# Patient Record
Sex: MALE | Race: OTHER | HISPANIC OR LATINO | Employment: UNEMPLOYED | URBAN - METROPOLITAN AREA
[De-identification: names, ages, dates, MRNs, and addresses within clinical notes are randomized per-mention and may not be internally consistent; named-entity substitution may affect disease eponyms.]

---

## 2023-01-26 ENCOUNTER — HOSPITAL ENCOUNTER (EMERGENCY)
Facility: HOSPITAL | Age: 33
Discharge: HOME | End: 2023-01-26
Attending: EMERGENCY MEDICINE

## 2023-01-26 VITALS
RESPIRATION RATE: 20 BRPM | TEMPERATURE: 97.7 F | HEART RATE: 101 BPM | WEIGHT: 257.28 LBS | DIASTOLIC BLOOD PRESSURE: 97 MMHG | OXYGEN SATURATION: 98 % | SYSTOLIC BLOOD PRESSURE: 180 MMHG

## 2023-01-26 DIAGNOSIS — G89.29 CHRONIC BACK PAIN: Primary | ICD-10-CM

## 2023-01-26 DIAGNOSIS — M54.9 CHRONIC BACK PAIN: Primary | ICD-10-CM

## 2023-01-26 RX ORDER — NAPROXEN 500 MG/1
500 TABLET ORAL 2 TIMES DAILY WITH MEALS
Qty: 30 TABLET | Refills: 0 | Status: SHIPPED | OUTPATIENT
Start: 2023-01-26

## 2023-01-26 NOTE — Clinical Note
Belem La was seen and treated in our emergency department on 1/26/2023  Diagnosis:     Chester  may return to work on return date  He may return on this date: 01/27/2023         If you have any questions or concerns, please don't hesitate to call        Zoltan Wilson MD    ______________________________           _______________          _______________  Hospital Representative                              Date                                Time

## 2023-01-27 NOTE — ED PROVIDER NOTES
History  Chief Complaint   Patient presents with   • Back Pain     Patient is a 33-year-old male who presents with a 6+ year h/o back pain  Ongoing issue  Was seen by doctors in [place]  But now here  Had to leave work early due to pain  No direct trauma  No numbness/weakness  Taking OTC motrin with some relief  Has not seen a doctor in [place] yet  No radiation  Right lower back  Worse with movement  Patient is also asking for info for weight loss  Believes all started when he gained weight  None       History reviewed  No pertinent past medical history  History reviewed  No pertinent surgical history  History reviewed  No pertinent family history  I have reviewed and agree with the history as documented  E-Cigarette/Vaping     E-Cigarette/Vaping Substances     Social History     Tobacco Use   • Smoking status: Every Day     Types: Cigarettes     Passive exposure: Never   • Smokeless tobacco: Never   Substance Use Topics   • Alcohol use: Not Currently   • Drug use: Never       Review of Systems   Constitutional: Negative  HENT: Negative  Eyes: Negative  Respiratory: Negative  Cardiovascular: Negative  Gastrointestinal: Negative  Endocrine: Negative  Genitourinary: Negative  Musculoskeletal: Positive for back pain  Skin: Negative  Allergic/Immunologic: Negative  Neurological: Negative  Hematological: Negative  Psychiatric/Behavioral: Negative  All other systems reviewed and are negative  Physical Exam  Physical Exam  Vitals and nursing note reviewed  Constitutional:       Appearance: Normal appearance  He is obese  HENT:      Head: Normocephalic and atraumatic  Cardiovascular:      Rate and Rhythm: Normal rate and regular rhythm  Pulses: Normal pulses  Heart sounds: Normal heart sounds  Pulmonary:      Effort: Pulmonary effort is normal       Breath sounds: Normal breath sounds     Abdominal:      General: Bowel sounds are normal       Palpations: Abdomen is soft  Musculoskeletal:         General: No swelling, tenderness, deformity or signs of injury  Cervical back: Normal range of motion and neck supple  Right lower leg: No edema  Left lower leg: No edema  Skin:     General: Skin is warm and dry  Capillary Refill: Capillary refill takes less than 2 seconds  Neurological:      General: No focal deficit present  Mental Status: He is alert and oriented to person, place, and time  Sensory: No sensory deficit  Deep Tendon Reflexes: Reflexes normal    Psychiatric:         Mood and Affect: Mood normal          Behavior: Behavior normal          Vital Signs  ED Triage Vitals [01/26/23 2112]   Temperature Pulse Respirations Blood Pressure SpO2   97 7 °F (36 5 °C) 101 20 (!) 180/97 98 %      Temp Source Heart Rate Source Patient Position - Orthostatic VS BP Location FiO2 (%)   Tympanic Monitor Sitting Left arm --      Pain Score       --           Vitals:    01/26/23 2112   BP: (!) 180/97   Pulse: 101   Patient Position - Orthostatic VS: Sitting         Visual Acuity      ED Medications  Medications - No data to display    Diagnostic Studies  Results Reviewed     None                 No orders to display              Procedures  Procedures         ED Course                                             Medical Decision Making  Chronic back pain: complicated acute illness or injury  Amount and/or Complexity of Data Reviewed  Independent Historian:      Details:   Risk  Prescription drug management            Disposition  Final diagnoses:   Chronic back pain     Time reflects when diagnosis was documented in both MDM as applicable and the Disposition within this note     Time User Action Codes Description Comment    1/26/2023  9:47 PM Ryan Waldron Add [M54 9,  G89 29] Chronic back pain       ED Disposition     ED Disposition   Discharge    Condition   Stable    Date/Time   Thu Jan 26, 2023 9:47 PM    Comment   Cheo Solano discharge to home/self care                 Follow-up Information     Follow up With Specialties Details Why Contact Info Additional [address]   [address], [place] [address] [phone]  [address], [address], [address] [place], [place], [address]          Discharge Medication List as of 1/26/2023  9:48 PM      START taking these medications    Details   naproxen (Naprosyn) 500 mg tablet Take 1 tablet (500 mg total) by mouth 2 (two) times a day with meals, Starting Thu 1/26/2023, Print                 PDMP Review     None          ED Provider  Electronically Signed by           Koffi Bustos MD  01/26/23 1171

## 2023-01-30 ENCOUNTER — TELEPHONE (OUTPATIENT)
Dept: PHYSICAL THERAPY | Facility: OTHER | Age: 33
End: 2023-01-30

## 2023-01-30 NOTE — TELEPHONE ENCOUNTER
Nurse intended on reaching out to discuss recent referral entered for [facility]   No contact number entered in demographics  Nurse confirmed [facility] information was listed on his ED AVS     Referral closed and will await possible call from patient

## 2023-04-21 ENCOUNTER — HOSPITAL ENCOUNTER (EMERGENCY)
Facility: HOSPITAL | Age: 33
Discharge: HOME | End: 2023-04-21
Attending: INTERNAL MEDICINE

## 2023-04-21 VITALS
OXYGEN SATURATION: 95 % | HEART RATE: 104 BPM | TEMPERATURE: 98.2 F | WEIGHT: 257.94 LBS | DIASTOLIC BLOOD PRESSURE: 85 MMHG | RESPIRATION RATE: 16 BRPM | SYSTOLIC BLOOD PRESSURE: 147 MMHG

## 2023-04-21 DIAGNOSIS — R00.2 PALPITATIONS: ICD-10-CM

## 2023-04-21 DIAGNOSIS — F41.9 ANXIETY: Primary | ICD-10-CM

## 2023-04-21 LAB
ATRIAL RATE: 92 BPM
P AXIS: 53 DEGREES
PR INTERVAL: 178 MS
QRS AXIS: 19 DEGREES
QRSD INTERVAL: 98 MS
QT INTERVAL: 340 MS
QTC INTERVAL: 420 MS
T WAVE AXIS: 25 DEGREES
VENTRICULAR RATE: 92 BPM

## 2023-04-21 RX ORDER — HYDROXYZINE HYDROCHLORIDE 25 MG/1
25 TABLET, FILM COATED ORAL EVERY 6 HOURS
Qty: 12 TABLET | Refills: 0 | Status: SHIPPED | OUTPATIENT
Start: 2023-04-21

## 2023-04-21 RX ORDER — HYDROXYZINE HYDROCHLORIDE 25 MG/1
25 TABLET, FILM COATED ORAL ONCE
Status: COMPLETED | OUTPATIENT
Start: 2023-04-21 | End: 2023-04-21

## 2023-04-21 RX ADMIN — HYDROXYZINE HYDROCHLORIDE 25 MG: 25 TABLET ORAL at 09:00

## 2023-04-21 NOTE — ED PROVIDER NOTES
"History  Chief Complaint   Patient presents with   • Anxiety     Since October, on and off and has tried to see PCP-which will do testing to r/o heart issues  He feels terrible palpitation at times and last night did not sleep  53-year-old male presenting today with concerns of anxiety and palpitations whenever he goes to sleep  Patient states that he works late shift and gets home at around 3 to 4 AM and whenever he lays down in bed he feels difficulty sleeping and some palpitation  Patient states that he feels like his brain is \"always on\"  Patient denies any chest pain or shortness of breath denies any symptoms while awake  Patient states that he has had lots of difficulty sleeping because his brain keeps racing  Denies any nausea, vomiting, diarrhea, constipation, abdominal pain, back pain, or any other symptoms at this time  Patient states that he has tried to get into a doctor in [place] however he would like to get one here if he can  Prior to Admission Medications   Prescriptions Last Dose Informant Patient Reported? Taking?   naproxen (Naprosyn) 500 mg tablet   No No   Sig: Take 1 tablet (500 mg total) by mouth 2 (two) times a day with meals      Facility-Administered Medications: None       History reviewed  No pertinent past medical history  Past Surgical History:   Procedure Laterality Date   • FRACTURE SURGERY         History reviewed  No pertinent family history  I have reviewed and agree with the history as documented  E-Cigarette/Vaping     E-Cigarette/Vaping Substances     Social History     Tobacco Use   • Smoking status: Every Day     Packs/day: 0 25     Types: Cigarettes     Passive exposure: Never   • Smokeless tobacco: Never   Substance Use Topics   • Alcohol use: Not Currently   • Drug use: Never       Review of Systems   Constitutional: Negative for chills and fever  HENT: Negative for ear pain and sore throat      Eyes: Negative for pain and visual " disturbance  Respiratory: Negative for apnea, cough, choking, chest tightness, shortness of breath, wheezing and stridor  Cardiovascular: Positive for palpitations  Negative for chest pain and leg swelling  Gastrointestinal: Negative for abdominal pain, constipation, diarrhea, nausea and vomiting  Genitourinary: Negative for dysuria, flank pain and hematuria  Musculoskeletal: Negative for arthralgias and back pain  Skin: Negative for color change and rash  Neurological: Negative for dizziness, seizures, syncope, weakness and headaches  All other systems reviewed and are negative  Physical Exam  Physical Exam  Vitals and nursing note reviewed  Constitutional:       General: He is not in acute distress  Appearance: He is well-developed  HENT:      Head: Normocephalic and atraumatic  Eyes:      Comments: Conjunctiva red, patient states because he hasn't been sleeping  No pain or dischaege  Cardiovascular:      Rate and Rhythm: Normal rate and regular rhythm  Pulses: Normal pulses  Heart sounds: Normal heart sounds  No murmur heard  No friction rub  No gallop  Pulmonary:      Effort: Pulmonary effort is normal  No respiratory distress  Breath sounds: Normal breath sounds  No stridor  No wheezing, rhonchi or rales  Chest:      Chest wall: No tenderness  Abdominal:      Palpations: Abdomen is soft  Tenderness: There is no abdominal tenderness  There is no right CVA tenderness or left CVA tenderness  Musculoskeletal:         General: No swelling, tenderness or signs of injury  Cervical back: Neck supple  Right lower leg: No edema  Left lower leg: No edema  Skin:     General: Skin is warm and dry  Capillary Refill: Capillary refill takes less than 2 seconds  Coloration: Skin is not jaundiced or pale  Findings: No bruising, erythema, lesion or rash  Neurological:      Mental Status: He is alert     Psychiatric:         Mood and Affect: Mood normal          Vital Signs  ED Triage Vitals [04/21/23 0838]   Temperature Pulse Respirations Blood Pressure SpO2   98 2 °F (36 8 °C) 104 16 147/85 95 %      Temp Source Heart Rate Source Patient Position - Orthostatic VS BP Location FiO2 (%)   Oral Monitor Sitting Left arm --      Pain Score       --           Vitals:    04/21/23 0838   BP: 147/85   Pulse: 104   Patient Position - Orthostatic VS: Sitting         Visual Acuity      ED Medications  Medications   hydrOXYzine HCL (ATARAX) tablet 25 mg (25 mg Oral Given 4/21/23 0900)       Diagnostic Studies  Results Reviewed     None                 No orders to display              Procedures  ECG 12 Lead Documentation Only    Date/Time: 4/21/2023 9:49 AM  Performed by: Chucky Alonso PA-C  Authorized by: Chucky Alonso PA-C     Indications / Diagnosis:  Palpitations  ECG reviewed by me, the ED Provider: yes    Patient location:  ED  Previous ECG:     Previous ECG:  Unavailable    Comparison to cardiac monitor: Yes    Interpretation:     Interpretation: normal    Rate:     ECG rate:  92    ECG rate assessment: normal    Rhythm:     Rhythm: sinus rhythm    Ectopy:     Ectopy: none    QRS:     QRS axis:  Normal    QRS intervals:  Normal  Conduction:     Conduction: normal    ST segments:     ST segments:  Normal  T waves:     T waves: normal               ED Course                               SBIRT 20yo+    Flowsheet Row Most Recent Value   Initial Alcohol Screen: US AUDIT-C     1  How often do you have a drink containing alcohol? 0 Filed at: 04/21/2023 0843   2  How many drinks containing alcohol do you have on a typical day you are drinking? 0 Filed at: 04/21/2023 0843   3a  Male UNDER 65: How often do you have five or more drinks on one occasion? 0 Filed at: 04/21/2023 0843   3b  FEMALE Any Age, or MALE 65+: How often do you have 4 or more drinks on one occassion?  0 Filed at: 04/21/2023 0843   Audit-C Score 0 Filed at: 04/21/2023 7376   MATHIEU: "How many times in the past year have you    Used an illegal drug or used a prescription medication for non-medical reasons? Never Filed at: 04/21/2023 6987                    Medical Decision Making  29year old male presenting today with concerns of mind racing and anxiety when he tries to sleep  Works late shift  States that when he goes to bed he feels some palpitations and mind racing  Denies wanting to do any blood work but is concerned that he has not been able to sleep  EKG and atarax  Does not feel palpitations at this time  EKG reviewed by me, independent interpretation available above  Will send Atarax over to patient's pharmacy  Referral sent for family doctor here that patient was informed to follow-up with for further evaluation and work-up of anxiety symptoms  All questions answered  Patient at time of discharge is well-appearing in no acute distress  Patient's vitals, lab/imaging results, diagnosis, and treatment plan were discussed with the patient  All new/changed medications were discussed with patient, specifically, route of administration, how often and when to take, and where they can be picked up  Strict return precautions as well as close follow up with PCP was discussed with the patient and the patient was agreeable to my recommendations  Patient verbally acknowledged understanding of the above communications  All labs reviewed and utilized in the medical decision making process (if labs were ordered)  Portions of the record may have been created with voice recognition software   Occasional wrong word or \"sound a like\" substitutions may have occurred due to the inherent limitations of voice recognition software   Read the chart carefully and recognize, using context, where substitutions have occurred  Risk  Prescription drug management            Disposition  Final diagnoses:   Anxiety   Palpitations     Time reflects when diagnosis was documented in both MDM as applicable and " the Disposition within this note     Time User Action Codes Description Comment    4/21/2023  9:06 AM Marija Mead Add [F41 9] Anxiety     4/21/2023  9:06 AM Marija Mead Add [R00 2] Palpitations       ED Disposition     ED Disposition   Discharge    Condition   Stable    Date/Time   Fri Apr 21, 2023  9:06 AM    Comment   Marla Jetertennille discharge to home/self care                 Follow-up Information     Follow up With Specialties Details Why Contact Info Additional [address] [facility] Emergency Medicine Go to  If symptoms worsen [address] [phone] [address] [facility]    [facility] Medicine Schedule an appointment as soon as possible for a visit  As needed [address] [facility] [place] [phone] [facility], [address] [place] [address], [id number] [phone]          Discharge Medication List as of 4/21/2023  9:10 AM      CONTINUE these medications which have NOT CHANGED    Details   naproxen (Naprosyn) 500 mg tablet Take 1 tablet (500 mg total) by mouth 2 (two) times a day with meals, Starting Thu 1/26/2023, Print                 PDMP Review     None          ED Provider  Electronically Signed by           Suha Schwab PA-C  04/21/23 8514

## 2023-09-12 ENCOUNTER — HOSPITAL ENCOUNTER (EMERGENCY)
Facility: HOSPITAL | Age: 33
Discharge: HOME | End: 2023-09-12
Attending: EMERGENCY MEDICINE | Admitting: EMERGENCY MEDICINE

## 2023-09-12 VITALS
DIASTOLIC BLOOD PRESSURE: 77 MMHG | RESPIRATION RATE: 20 BRPM | OXYGEN SATURATION: 97 % | TEMPERATURE: 97.1 F | HEART RATE: 90 BPM | WEIGHT: 253.8 LBS | SYSTOLIC BLOOD PRESSURE: 129 MMHG

## 2023-09-12 DIAGNOSIS — I10 HYPERTENSION: Primary | ICD-10-CM

## 2023-09-12 PROCEDURE — 99283 EMERGENCY DEPT VISIT LOW MDM: CPT | Performed by: EMERGENCY MEDICINE

## 2023-09-12 PROCEDURE — 99282 EMERGENCY DEPT VISIT SF MDM: CPT

## 2023-09-13 NOTE — ED PROVIDER NOTES
History  Chief Complaint   Patient presents with   • High Blood Pressure     Arrives reporting he was at work today and felt like he had high blood pressure. Checked it and it was in the 272E systolic. Asymptomatic. 37-year-old gentleman presents with complaint of hypertension. The patient reports that he has an intermittent history of elevated blood pressure but he does not currently have a family physician nor does he take any medication for this. He reports that he checked his blood pressure while at work earlier today and found that he was systolic in the 320T. He reports that he was asymptomatic at that time. Currently his blood pressure is normal and he remains asymptomatic. He denies any known family history of hypertension, smokes approximately 5 cigarettes daily, denies use of alcohol, and does not exercise or watch his diet. He also reports that he is under a great deal of stress which could be contributing to his elevated blood pressure. Hypertension  Severity:  Moderate  Onset quality:  Gradual  Timing:  Intermittent  Progression:  Resolved  Chronicity:  Recurrent  Context: stress    Exacerbated by: stress. Associated symptoms: no abdominal pain, no chest pain, no dizziness, no fever, no headaches, no shortness of breath, not vomiting and no weakness        Prior to Admission Medications   Prescriptions Last Dose Informant Patient Reported? Taking?   hydrOXYzine HCL (ATARAX) 25 mg tablet   No No   Sig: Take 1 tablet (25 mg total) by mouth every 6 (six) hours   naproxen (Naprosyn) 500 mg tablet   No No   Sig: Take 1 tablet (500 mg total) by mouth 2 (two) times a day with meals      Facility-Administered Medications: None       History reviewed. No pertinent past medical history. Past Surgical History:   Procedure Laterality Date   • FRACTURE SURGERY         History reviewed. No pertinent family history. I have reviewed and agree with the history as documented.     E-Cigarette/Vaping E-Cigarette/Vaping Substances     Social History     Tobacco Use   • Smoking status: Every Day     Packs/day: 0.25     Types: Cigarettes     Passive exposure: Never   • Smokeless tobacco: Never   Substance Use Topics   • Alcohol use: Not Currently   • Drug use: Never       Review of Systems   Constitutional: Negative for fever. Respiratory: Negative for shortness of breath. Cardiovascular: Negative for chest pain. Gastrointestinal: Negative for abdominal pain and vomiting. Neurological: Negative for dizziness, weakness and headaches. All other systems reviewed and are negative. Physical Exam  Physical Exam  Vitals and nursing note reviewed. Constitutional:       General: He is not in acute distress. Appearance: Normal appearance. He is well-developed. He is not ill-appearing, toxic-appearing or diaphoretic. HENT:      Head: Normocephalic and atraumatic. Right Ear: External ear normal.      Left Ear: External ear normal.      Nose: Nose normal.      Mouth/Throat:      Mouth: Mucous membranes are moist.      Pharynx: Oropharynx is clear. Eyes:      Conjunctiva/sclera: Conjunctivae normal.      Pupils: Pupils are equal, round, and reactive to light. Cardiovascular:      Rate and Rhythm: Normal rate and regular rhythm. Heart sounds: Normal heart sounds. Pulmonary:      Effort: Pulmonary effort is normal. No respiratory distress. Breath sounds: Normal breath sounds. Abdominal:      General: Bowel sounds are normal. There is no distension. Palpations: Abdomen is soft. Tenderness: There is no abdominal tenderness. There is no guarding. Musculoskeletal:         General: Normal range of motion. Cervical back: Neck supple. No rigidity. Right lower leg: No edema. Left lower leg: No edema. Skin:     General: Skin is warm and dry. Capillary Refill: Capillary refill takes less than 2 seconds.    Neurological:      General: No focal deficit present. Mental Status: He is alert and oriented to person, place, and time. Psychiatric:         Mood and Affect: Mood normal.         Behavior: Behavior normal.         Vital Signs  ED Triage Vitals [09/12/23 2240]   Temperature Pulse Respirations Blood Pressure SpO2   (!) 97.1 °F (36.2 °C) 90 20 129/77 97 %      Temp Source Heart Rate Source Patient Position - Orthostatic VS BP Location FiO2 (%)   Tympanic Monitor Sitting Left arm --      Pain Score       --           Vitals:    09/12/23 2240   BP: 129/77   Pulse: 90   Patient Position - Orthostatic VS: Sitting         Visual Acuity      ED Medications  Medications - No data to display    Diagnostic Studies  Results Reviewed     None                 No orders to display              Procedures  Procedures         ED Course                               SBIRT 20yo+    Flowsheet Row Most Recent Value   Initial Alcohol Screen: US AUDIT-C     1. How often do you have a drink containing alcohol? 0 Filed at: 09/12/2023 2246   2. How many drinks containing alcohol do you have on a typical day you are drinking? 0 Filed at: 09/12/2023 2246   3a. Male UNDER 65: How often do you have five or more drinks on one occasion? 0 Filed at: 09/12/2023 2246   Audit-C Score 0 Filed at: 09/12/2023 2246   MATHIEU: How many times in the past year have you. .. Used an illegal drug or used a prescription medication for non-medical reasons? Never Filed at: 09/12/2023 2246                    Medical Decision Making  31-year-old gentleman presents with concern for hypertension. His blood pressure here is noted to be normal.  He reports that he has been told in [place] and here previously that he does not need to be on medications because "I am young". The patient is currently asymptomatic with normal blood pressure. I explained to him that it would be inappropriate to start him on antihypertensives in this setting.   He does not currently have a family physician and I have told him that obtaining one would be the next most appropriate step. Disposition  Final diagnoses:   Hypertension     Time reflects when diagnosis was documented in both MDM as applicable and the Disposition within this note     Time User Action Codes Description Comment    9/12/2023 10:52 PM Lc Jean, [address] [facility] Hypertension       ED Disposition     ED Disposition   Discharge    Condition   Stable    Date/Time   Tue Sep 12, 2023 10:52 PM    Comment   [address] Ousmane Castillo discharge to home/self care.                Follow-up Information     Follow up With Specialties Details Why Contact Info Additional [address] [facility] Family Medicine Call   [address] [facility], [address] [place] [phone]  [address] [facility], [address] [facility], [address] [place] [address], [address] [place] [address]          Patient's Medications   Discharge Prescriptions    No medications on file           PDMP Review     None          ED Provider  Electronically Signed by           Wilson Vasques DO  09/12/23 0709